# Patient Record
Sex: MALE | Race: WHITE | URBAN - METROPOLITAN AREA
[De-identification: names, ages, dates, MRNs, and addresses within clinical notes are randomized per-mention and may not be internally consistent; named-entity substitution may affect disease eponyms.]

---

## 2024-11-14 PROBLEM — Z76.89 ENCOUNTER TO ESTABLISH CARE WITH NEW DOCTOR: Status: ACTIVE | Noted: 2024-11-14

## 2025-07-15 ENCOUNTER — OFFICE VISIT (OUTPATIENT)
Age: 50
End: 2025-07-15
Payer: COMMERCIAL

## 2025-07-15 DIAGNOSIS — S62.617A DISPLACED FRACTURE OF PROXIMAL PHALANX OF LEFT LITTLE FINGER, INITIAL ENCOUNTER FOR CLOSED FRACTURE: Primary | ICD-10-CM

## 2025-07-15 PROCEDURE — L3809 WHFO W/O JOINTS PRE OTS: HCPCS | Performed by: ORTHOPAEDIC SURGERY

## 2025-07-15 PROCEDURE — 99205 OFFICE O/P NEW HI 60 MIN: CPT | Performed by: ORTHOPAEDIC SURGERY

## 2025-07-15 RX ORDER — LEVOTHYROXINE SODIUM 150 UG/1
TABLET ORAL
COMMUNITY
Start: 2024-09-16

## 2025-07-16 ENCOUNTER — TELEPHONE (OUTPATIENT)
Dept: ORTHOPEDIC SURGERY | Age: 50
End: 2025-07-16

## 2025-07-16 DIAGNOSIS — S62.617A DISPLACED FRACTURE OF PROXIMAL PHALANX OF LEFT LITTLE FINGER, INITIAL ENCOUNTER FOR CLOSED FRACTURE: Primary | ICD-10-CM

## 2025-07-16 NOTE — PROGRESS NOTES
Patient was prescribed a Boxer Splint for the left hand. The top portion of the boxer splint is bent slightly to an angle that allows fingers to be in a relaxed position. The straps are opened to allow the hand to rest in the splint with the fourth and fifth fingers in the top portion of the splint. The top portion strap is secured around the fourth and fifth fingers. The strap around the wrist is then secured followed by the middle strap that is secured between the first finger and thumb    Patient read and signed documenting they understand and agree to [facility]'s current DME return policy.

## 2025-07-16 NOTE — PROGRESS NOTES
Orthopaedic Hand Clinic Note    Name: Garrick Reyes  YOB: 1975  Gender: male  MRN: [id number]      CC: Patient referred for evaluation of upper extremity pain    HPI: Garrick Reyes is a 50 y.o. male with a chief complaint of injury to the right small finger which occurred on 7/12 when he fell walking his dogs.  He was seen at urgent care. Xrays were obtained. He says the small finger was overlapping the right finger when the injury first happened      ROS/Meds/PSH/PMH/FH/SH: I personally reviewed the patients standard intake form.  Pertinents are discussed in the HPI    Physical Examination:    Musculoskeletal Exam:  Examination on the right upper extremity demonstrates cap refill < 5 seconds in all fingers, there is swelling, ecchymosis and tenderness to palpation over the small finger. There is an extension lag of about 30-40 degrees at the PIP. There is no apparent rotational deformity.    Imaging / Electrodiagnostic Tests:     I independently reviewed and interpreted right hand radiographs.  They demonstrate proximal phalanx base fracture of the small finger, with apex volar angulation      Assessment:     ICD-10-CM    1. Displaced fracture of proximal phalanx of left little finger, initial encounter for closed fracture  S62.617A [facility] [place] [facility]          Plan:   We discussed the diagnosis and different treatment options. We discussed observation, therapy, antiinflammatory medications and other pertinent treatment modalities.  We discussed that nonsurgical treatment would result in an extension lag deformity at the PIP, but this may be tolerable to him.     After discussing in detail the patient elects to proceed with surgical treatment.    Patient understands risks and benefits of open reduction with internal fixation of right small finger proximal phalanx fracture including but not limited to nerve injury, vessel injury, infection, failure to achieve

## 2025-07-17 DIAGNOSIS — S62.617A DISPLACED FRACTURE OF PROXIMAL PHALANX OF LEFT LITTLE FINGER, INITIAL ENCOUNTER FOR CLOSED FRACTURE: Primary | ICD-10-CM

## 2025-07-18 ENCOUNTER — OUTSIDE SERVICES (OUTPATIENT)
Age: 50
End: 2025-07-18

## 2025-07-18 ENCOUNTER — TELEPHONE (OUTPATIENT)
Dept: ORTHOPEDIC SURGERY | Age: 50
End: 2025-07-18

## 2025-07-18 DIAGNOSIS — S62.617A DISPLACED FRACTURE OF PROXIMAL PHALANX OF LEFT LITTLE FINGER, INITIAL ENCOUNTER FOR CLOSED FRACTURE: Primary | ICD-10-CM

## 2025-07-18 RX ORDER — IBUPROFEN 600 MG/1
600 TABLET, FILM COATED ORAL 3 TIMES DAILY PRN
Qty: 20 TABLET | Refills: 0 | Status: SHIPPED | OUTPATIENT
Start: 2025-07-18

## 2025-07-18 NOTE — TELEPHONE ENCOUNTER
I sopke with Mrs. Reyes to let her know that the Ibuprofen was correct ans her  has a svere allergy to Hydrocodone.  She voiced understanding.   She asked about her  needing a work note and I told her I would discuss it with Dr. 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 She again voiced understanding.

## 2025-07-18 NOTE — OP NOTE
ORTHOPAEDIC SURGICAL NOTE        Garrick Reyes male 50 y.o.  [id number]   [unfilled]     PRE-OP DIAGNOSIS: right small finger proximal phalanx  fracture  POST-OP DIAGNOSIS:same       PROCEDURES PERFORMED:   Open reduction internal fixation of right small finger proximal phalanx fracture cpt  55775       SURGEON:   Jia Rojas MD     IMPLANTS:   [unfilled]      * Surgery not found *   * No surgery found *     ANESTHESIA: * No surgery found *     STAFF:    * Surgery not found *     ESTIMATED BLOOD LOSS: Minimal       TOTAL IV FLUIDS : See anesthesia note    COMPLICATIONS: None     TOURNIQUET TIME: * No surgery found *     INDICATION FOR PROCEDURE:     Garrick Reyes sustained a displaced right small finger proximal phalanx fracture as a result of a fall  Surgical and non-surgical treatment options were discussed with the patient and their family, as well as the risk and benefits of each option. After thorough discussion, the patient decided to proceed with surgical management.  Specific to this treatment plan, we discussed in detail surgical risks including scar, pain, bleeding, infection, anesthetic risks, neurovascular injury, failure to achieve desired results, hardware problems, need for further surgery,  weakness, stiffness, risk of death and potential risk of other unforseen complication.       The Patient consented to the procedure after discussion of the risks and benefits.         DESCRIPTION OF PROCEDURE:     The patient was identified in the holding room. The right small finger was marked and confirmed as the correct operative site. They were then brought to the OR and transferred onto the OR table in the supine position. All bony prominences were well padded. SCDs were placed on the bilateral legs throughout the case. A timeout was performed, verifying the correct patient, the correct side  and the correct procedure. Antibiotics were then administered, and were redosed during

## 2025-07-18 NOTE — TELEPHONE ENCOUNTER
He had surgery this morning and none of his medications have been sent. I did add a pharmacy to his chart.

## 2025-07-18 NOTE — TELEPHONE ENCOUNTER
He had surgery this morning and the discharge nurse said he was going to get oxy but Ibuprofen was sent in. His wife just wants to make sure that's correct.

## 2025-07-18 NOTE — TELEPHONE ENCOUNTER
I called and spoke with patient.  I advised him I sent the message to Dr. Rojas and that she will send in his medications once she is finished in surgery.  Patient voiced understanding.

## 2025-07-21 NOTE — TELEPHONE ENCOUNTER
I spoke with Errol Amy to let him know that Dr. Rojas does not want him going back to work before she sees him for his post-op visit.  Then at that time, she will put him back to work with restrictions:  Must wear brace/splint, no lifting, pushing or pulling anything greater that 1 pound.  He asked for how long and I told him that Dr. Rojas did not say.  I asked if he needed a work note to keep him out until then and he stated that he did not think so.  I told him if he finds out he needs one, just to let us know. He voiced understanding.

## 2025-07-25 ENCOUNTER — EVALUATION (OUTPATIENT)
Age: 50
End: 2025-07-25
Payer: COMMERCIAL

## 2025-07-25 DIAGNOSIS — M25.60 DECREASED RANGE OF MOTION: Primary | ICD-10-CM

## 2025-07-25 DIAGNOSIS — R29.898 WEAKNESS OF LEFT HAND: ICD-10-CM

## 2025-07-25 DIAGNOSIS — M25.642 STIFFNESS OF FINGER JOINT OF LEFT HAND: ICD-10-CM

## 2025-07-25 DIAGNOSIS — Z78.9 DECREASED ACTIVITIES OF DAILY LIVING (ADL): ICD-10-CM

## 2025-07-25 PROCEDURE — 97110 THERAPEUTIC EXERCISES: CPT

## 2025-07-25 PROCEDURE — 97166 OT EVAL MOD COMPLEX 45 MIN: CPT

## 2025-07-25 NOTE — PROGRESS NOTES
[facility]  [address] [place] [id number]  Dept: [phone]   Occupational Therapy Initial Assessment     PERTINENT MEDICAL HISTORY   Referring MD: Friend, Jia COLE MD  Diagnosis: No diagnosis found.   Surgery or Medical Dx:   Date of Injury/SX;7/18/25 ORIF RIGHT SF PP  [x] Op Note reviewed/  Special instructions: standard protocol  Co-morbidities affecting plan of care / Therapy precautions: Fracture precautions     SUBJECTIVE   History of injury (how symptoms started) : The patient is a 50 year old male s/p fall 7/12/25 and sustained a left SF PP fracture. He underwent ORIF on 7/18/25.    Chief Complaint/Current Symptoms: stiffness  Nature of condition: Recent onset (initial onset within last 3 months)  Primary cause of current episode: Traumatic  Number of Occupational Therapy Visits in past 90 days: 0    Pain Assessment:  Description: throbbing  Average Pain/symptom intensity (0-10 scale)  Last 24 hours: 3/10  Last week (1-7 days): 3/10  How often do you feel symptoms? Intermittently (0-25%)  Aggravating factors: motion  Alleviating factors: rest  How much have your symptoms interfered with daily activities? Quite a bit    Neuro screen: Pt denies c/o    Social/Functional Hx:  In general, would you say your current overall health is very good  Pt lives family   Current DME: brace/splintprefab splint  Work Status: drives a truck   Sleep: minimally disturbed (less than 6 hours sleep)  PLOF & Social Hx/Interests: Independent and active without physical limitations  Current level of function: requires min assist with ADL's/IADLs    Patient Stated Goals: get hand better  OBJECTIVE   Functional Outcome Measures: Quick Dash  33 score=   50 % functional deficit at IE  Hand/Side Dominance: right handed  Observation  Posture: Normal  Swelling/Edema: minimal left hand  Skin Integrity: dressing changed; xeroform reapplied   Palpation/Joint mobility: n/a  UE ROM:  Digital AROM:degrees

## 2025-07-29 ENCOUNTER — OFFICE VISIT (OUTPATIENT)
Age: 50
End: 2025-07-29

## 2025-07-29 ENCOUNTER — TELEPHONE (OUTPATIENT)
Dept: ORTHOPEDIC SURGERY | Age: 50
End: 2025-07-29

## 2025-07-29 ENCOUNTER — TREATMENT (OUTPATIENT)
Age: 50
End: 2025-07-29
Payer: COMMERCIAL

## 2025-07-29 DIAGNOSIS — R29.898 WEAKNESS OF LEFT HAND: ICD-10-CM

## 2025-07-29 DIAGNOSIS — Z78.9 DECREASED ACTIVITIES OF DAILY LIVING (ADL): ICD-10-CM

## 2025-07-29 DIAGNOSIS — M25.642 STIFFNESS OF FINGER JOINT OF LEFT HAND: Primary | ICD-10-CM

## 2025-07-29 DIAGNOSIS — M25.60 DECREASED RANGE OF MOTION: ICD-10-CM

## 2025-07-29 DIAGNOSIS — S62.617A DISPLACED FRACTURE OF PROXIMAL PHALANX OF LEFT LITTLE FINGER, INITIAL ENCOUNTER FOR CLOSED FRACTURE: Primary | ICD-10-CM

## 2025-07-29 PROCEDURE — 97140 MANUAL THERAPY 1/> REGIONS: CPT | Performed by: OCCUPATIONAL THERAPIST

## 2025-07-29 PROCEDURE — 99024 POSTOP FOLLOW-UP VISIT: CPT | Performed by: ORTHOPAEDIC SURGERY

## 2025-07-29 PROCEDURE — L3913 HFO W/O JOINTS CF: HCPCS | Performed by: OCCUPATIONAL THERAPIST

## 2025-07-29 PROCEDURE — 97110 THERAPEUTIC EXERCISES: CPT | Performed by: OCCUPATIONAL THERAPIST

## 2025-07-29 NOTE — TELEPHONE ENCOUNTER
I called and spoke with patient.  He states he will  his work note on Friday when he is in the office for therapy.

## 2025-07-29 NOTE — PROGRESS NOTES
[facility]  [address] [place] [id number]  Dept: [phone]   Occupational Therapy Initial Assessment     PERTINENT MEDICAL HISTORY   Referring MD: Friend, Jia COLE MD  Diagnosis:     ICD-10-CM    1. Stiffness of finger joint of left hand  M25.642       2. Weakness of left hand  R29.898       3. Decreased range of motion  M25.60       4. Decreased activities of daily living (ADL)  Z78.9          Surgery or Medical Dx:   Date of Injury/SX;7/18/25 ORIF RIGHT SF PP  [x] Op Note reviewed/  Special instructions: standard protocol  Co-morbidities affecting plan of care / Therapy precautions: Fracture precautions     SUBJECTIVE   History of injury (how symptoms started) : The patient is a 50 year old male s/p fall 7/12/25 and sustained a left SF PP fracture. He underwent ORIF on 7/18/25.    Chief Complaint/Current Symptoms: stiffness  Nature of condition: Recent onset (initial onset within last 3 months)  Primary cause of current episode: Traumatic  Number of Occupational Therapy Visits in past 90 days: 0    Pain Assessment:  Description: throbbing  Average Pain/symptom intensity (0-10 scale)  Last 24 hours: 3/10  Last week (1-7 days): 3/10  How often do you feel symptoms? Intermittently (0-25%)  Aggravating factors: motion  Alleviating factors: rest  How much have your symptoms interfered with daily activities? Quite a bit    Neuro screen: Pt denies c/o    Social/Functional Hx:  In general, would you say your current overall health is very good  Pt lives family   Current DME: brace/splintprefab splint  Work Status: drives a truck   Sleep: minimally disturbed (less than 6 hours sleep)  PLOF & Social Hx/Interests: Independent and active without physical limitations  Current level of function: requires min assist with ADL's/IADLs    Patient Stated Goals: get hand better  OBJECTIVE   Functional Outcome Measures: Quick Dash  33 score=   50 % functional deficit at IE  Hand/Side

## 2025-07-29 NOTE — PROGRESS NOTES
Orthopaedic Hand Surgery Note    Name: Garrick Reyes  YOB: 1975  Gender: male  MRN: [id number]    Post Operative Visit: open reduction with internal fixation of right small finger proximal phalanx fracture     HPI: Patient is status post  open reduction with internal fixation of right small finger proximal phalanx fracture  Patient reports well controlled pain.    Physical Examination:  Surgical incision is clean, dry and intact.  Sutures are in place.  There is no erythema or drainage. Sensation is intact in all fingers. Motor exam reveals no deficits. Ring and small finger range of motion is limited.    Imaging:     Finger XR: AP, Lateral, Oblique views     Clinical Indication:  1. Displaced fracture of proximal phalanx of left little finger, initial encounter for closed fracture           Report: AP, lateral, oblique x-ray of the left small finger demonstrates s/p headless compression screw osteosynthesis of proximal phalanx fracture, no change in alignment or hardware complication    Impression: as above     Jia Rojas MD         Assessment:   1. Displaced fracture of proximal phalanx of left little finger, initial encounter for closed fracture         Status post  open reduction with internal fixation of right small finger proximal phalanx fracture     Plan:  We discussed the post operative course and progression.  Sutures removed today and Steri-Strips were applied.  He is informed to continue hand therapy.  He can perform active MCP, PIP and DIP motion and gentle passive PIP and DIP motion. He should use his brace at all times except for hygiene and range of motion exercises. He should avoid any lifting with the left hand. He will follow up in 4 weeks for repeat radiographs      Jia Rojas MD  07/29/25  1:13 PM

## 2025-07-29 NOTE — TELEPHONE ENCOUNTER
He got a different splint today. His wife wants to talk to someone about him returning to work. Please return her call.

## 2025-08-01 ENCOUNTER — TREATMENT (OUTPATIENT)
Age: 50
End: 2025-08-01
Payer: COMMERCIAL

## 2025-08-01 DIAGNOSIS — R29.898 WEAKNESS OF LEFT HAND: ICD-10-CM

## 2025-08-01 DIAGNOSIS — M25.60 DECREASED RANGE OF MOTION: ICD-10-CM

## 2025-08-01 DIAGNOSIS — M25.642 STIFFNESS OF FINGER JOINT OF LEFT HAND: Primary | ICD-10-CM

## 2025-08-01 DIAGNOSIS — Z78.9 DECREASED ACTIVITIES OF DAILY LIVING (ADL): ICD-10-CM

## 2025-08-01 PROCEDURE — 97110 THERAPEUTIC EXERCISES: CPT | Performed by: OCCUPATIONAL THERAPIST

## 2025-08-01 PROCEDURE — 97140 MANUAL THERAPY 1/> REGIONS: CPT | Performed by: OCCUPATIONAL THERAPIST

## 2025-08-01 PROCEDURE — 97010 HOT OR COLD PACKS THERAPY: CPT | Performed by: OCCUPATIONAL THERAPIST

## 2025-08-01 NOTE — PROGRESS NOTES
[facility]  [address] [place] [id number]  Dept: [phone]   Occupational Therapy Daily Note     PERTINENT MEDICAL HISTORY   Referring MD: Friend, Jia COLE MD  Diagnosis:     ICD-10-CM    1. Stiffness of finger joint of left hand  M25.642       2. Weakness of left hand  R29.898       3. Decreased range of motion  M25.60       4. Decreased activities of daily living (ADL)  Z78.9            Surgery or Medical Dx:   Date of Injury/SX;7/18/25 ORIF RIGHT SF PP  [x] Op Note reviewed/  Special instructions: standard protocol  Co-morbidities affecting plan of care / Therapy precautions: Fracture precautions     SUBJECTIVE   Pt reports that he has the most pain at the tips of his RF and SF. He reports minimal pain at fracture site. He reports he is engaging in more motion as able.   OBJECTIVE   Functional Outcome Measures: Quick Dash  33 score=   50 % functional deficit at IE  Hand/Side Dominance: right handed  Observation  Posture: Normal  Swelling/Edema: minimal left hand  Skin Integrity: dressing changed; xeroform reapplied   Palpation/Joint mobility: n/a  UE ROM:  Digital AROM:degrees IF MF RF SF   MCP   0/40 -10/30   PIP   -10/50 -25/35   DIP   0/15 /5   Flexion to DPC 1.5 cm 1.5 cm         Special Tests: None performed  Evaluation Modifications/Assistance required : Verbal cues and Physical cues  Degree of assistance provided: moderate    Payor: Payor: [place] [employer] /  /  /  Billing pattern: Commercial- substantial/midpoint time each CPT   Authorized Visits:  Episode visit count:  Visit count could not be calculated. Make sure you are using a visit which is associated with an episode.     CPT /   Modifier needed: No  REFERRAL UNITS Treatment Time (1:1)  Total Time    (in office)          Therapeutic exercise (94828) to develop ROM, strength, endurance and flexibility 2 30    52480 Manual therapy to improve joint and/or soft tissue mobility, ROM, and function as well as helping to

## 2025-08-08 ENCOUNTER — TREATMENT (OUTPATIENT)
Age: 50
End: 2025-08-08

## 2025-08-08 DIAGNOSIS — Z78.9 DECREASED ACTIVITIES OF DAILY LIVING (ADL): ICD-10-CM

## 2025-08-08 DIAGNOSIS — R29.898 WEAKNESS OF LEFT HAND: ICD-10-CM

## 2025-08-08 DIAGNOSIS — M25.642 STIFFNESS OF FINGER JOINT OF LEFT HAND: Primary | ICD-10-CM

## 2025-08-08 DIAGNOSIS — M25.60 DECREASED RANGE OF MOTION: ICD-10-CM

## 2025-08-15 ENCOUNTER — TREATMENT (OUTPATIENT)
Age: 50
End: 2025-08-15

## 2025-08-15 DIAGNOSIS — M25.642 STIFFNESS OF FINGER JOINT OF LEFT HAND: Primary | ICD-10-CM

## 2025-08-15 DIAGNOSIS — M25.60 DECREASED RANGE OF MOTION: ICD-10-CM

## 2025-08-15 DIAGNOSIS — Z78.9 DECREASED ACTIVITIES OF DAILY LIVING (ADL): ICD-10-CM

## 2025-08-15 DIAGNOSIS — R29.898 WEAKNESS OF LEFT HAND: ICD-10-CM

## 2025-08-28 ENCOUNTER — OFFICE VISIT (OUTPATIENT)
Age: 50
End: 2025-08-28

## 2025-08-28 ENCOUNTER — TREATMENT (OUTPATIENT)
Age: 50
End: 2025-08-28

## 2025-08-28 DIAGNOSIS — M25.642 STIFFNESS OF FINGER JOINT OF LEFT HAND: Primary | ICD-10-CM

## 2025-08-28 DIAGNOSIS — Z78.9 DECREASED ACTIVITIES OF DAILY LIVING (ADL): ICD-10-CM

## 2025-08-28 DIAGNOSIS — R29.898 WEAKNESS OF LEFT HAND: ICD-10-CM

## 2025-08-28 DIAGNOSIS — M25.60 DECREASED RANGE OF MOTION: ICD-10-CM

## 2025-08-28 DIAGNOSIS — S62.617A DISPLACED FRACTURE OF PROXIMAL PHALANX OF LEFT LITTLE FINGER, INITIAL ENCOUNTER FOR CLOSED FRACTURE: Primary | ICD-10-CM

## 2025-08-28 PROCEDURE — 97010 HOT OR COLD PACKS THERAPY: CPT

## 2025-08-28 PROCEDURE — 99024 POSTOP FOLLOW-UP VISIT: CPT | Performed by: ORTHOPAEDIC SURGERY

## 2025-08-29 ENCOUNTER — TREATMENT (OUTPATIENT)
Age: 50
End: 2025-08-29

## 2025-08-29 DIAGNOSIS — M25.642 STIFFNESS OF FINGER JOINT OF LEFT HAND: Primary | ICD-10-CM

## 2025-08-29 DIAGNOSIS — Z78.9 DECREASED ACTIVITIES OF DAILY LIVING (ADL): ICD-10-CM

## 2025-08-29 DIAGNOSIS — M25.60 DECREASED RANGE OF MOTION: ICD-10-CM

## 2025-08-29 DIAGNOSIS — R29.898 WEAKNESS OF LEFT HAND: ICD-10-CM
